# Patient Record
Sex: MALE | Race: WHITE | Employment: FULL TIME | URBAN - METROPOLITAN AREA
[De-identification: names, ages, dates, MRNs, and addresses within clinical notes are randomized per-mention and may not be internally consistent; named-entity substitution may affect disease eponyms.]

---

## 2018-07-08 ENCOUNTER — HOSPITAL ENCOUNTER (EMERGENCY)
Age: 63
Discharge: HOME | End: 2018-07-08
Payer: COMMERCIAL

## 2018-07-08 VITALS
HEIGHT: 71 IN | OXYGEN SATURATION: 97 % | RESPIRATION RATE: 12 BRPM | DIASTOLIC BLOOD PRESSURE: 92 MMHG | TEMPERATURE: 97.3 F | WEIGHT: 185 LBS | BODY MASS INDEX: 25.9 KG/M2 | SYSTOLIC BLOOD PRESSURE: 157 MMHG | HEART RATE: 58 BPM

## 2018-07-08 DIAGNOSIS — A69.20 ERYTHEMA MIGRANS (LYME DISEASE): Primary | ICD-10-CM
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
## 2018-07-08 DIAGNOSIS — S40.862A TICK BITE OF LEFT UPPER ARM, INITIAL ENCOUNTER: ICD-10-CM

## 2018-07-08 PROCEDURE — 86618 LYME DISEASE ANTIBODY: CPT

## 2018-07-08 PROCEDURE — 99281 EMR DPT VST MAYX REQ PHY/QHP: CPT

## 2018-07-08 PROCEDURE — 6360000002 HC RX W HCPCS: Performed by: NURSE PRACTITIONER

## 2018-07-08 PROCEDURE — 90715 TDAP VACCINE 7 YRS/> IM: CPT | Performed by: NURSE PRACTITIONER

## 2018-07-08 PROCEDURE — 90471 IMMUNIZATION ADMIN: CPT | Performed by: NURSE PRACTITIONER

## 2018-07-08 PROCEDURE — 6370000000 HC RX 637 (ALT 250 FOR IP): Performed by: NURSE PRACTITIONER

## 2018-07-08 PROCEDURE — 99282 EMERGENCY DEPT VISIT SF MDM: CPT

## 2018-07-08 RX ORDER — DOXYCYCLINE HYCLATE 100 MG
100 TABLET ORAL 2 TIMES DAILY
Qty: 42 TABLET | Refills: 0 | Status: SHIPPED | OUTPATIENT
Start: 2018-07-08 | End: 2018-07-29

## 2018-07-08 RX ORDER — DOXYCYCLINE HYCLATE 100 MG/1
100 CAPSULE ORAL ONCE
Status: COMPLETED | OUTPATIENT
Start: 2018-07-08 | End: 2018-07-08

## 2018-07-08 RX ADMIN — TETANUS TOXOID, REDUCED DIPHTHERIA TOXOID AND ACELLULAR PERTUSSIS VACCINE, ADSORBED 0.5 ML: 5; 2.5; 8; 8; 2.5 SUSPENSION INTRAMUSCULAR at 11:01

## 2018-07-08 RX ADMIN — DOXYCYCLINE HYCLATE 100 MG: 100 CAPSULE, GELATIN COATED ORAL at 11:00

## 2018-07-08 ASSESSMENT — PAIN DESCRIPTION - LOCATION: LOCATION: ARM

## 2018-07-08 ASSESSMENT — PAIN DESCRIPTION - FREQUENCY: FREQUENCY: INTERMITTENT

## 2018-07-08 ASSESSMENT — PAIN DESCRIPTION - DESCRIPTORS: DESCRIPTORS: ACHING

## 2018-07-08 ASSESSMENT — PAIN SCALES - GENERAL: PAINLEVEL_OUTOF10: 6

## 2018-07-08 ASSESSMENT — PAIN DESCRIPTION - PAIN TYPE: TYPE: ACUTE PAIN

## 2018-07-08 ASSESSMENT — PAIN DESCRIPTION - ORIENTATION: ORIENTATION: LEFT

## 2018-07-08 NOTE — ED PROVIDER NOTES
[facility]       Department of Emergency Medicine   ED  Provider Note  Admit Date/RoomTime: 7/8/2018 10:01 AM  ED Room: [place] [address]   Chief Complaint   Insect Bite (tick bite last Saturday, LUE, left shoulder pain, bullseye noted)    History of Present Illness   Source of history provided by:  patient. History/Exam Limitations: none. Carrie Pearce is a 66 y.o. old male with has a past medical history of: History reviewed. No pertinent past medical history. presents to the emergency department by private vehicle and ambulatory, for complaint of gradual onset red area on back of left upper arm which began a few day(s) prior to arrival. Patient noticed 7-8 days ago that he felt what he thought to be a scab on the back of his left upper arm and he was going to pull it off however part of it was attached. He was out doing yard work and was distracted from it and never checked it again until he noticed a small bump on Friday. Since then, he has been having a red round rash. He denies any fever, chills, cough and admits to some left shoulder ache without decreased range of motion. He is unsure of when his last tetanus vaccination. There are no household contacts with similar rash. He denies any new lotions, soaps, detergents, pets or medications. **Informed Consent**    The patient has given verbal consent to have photos taken of left posterior upper arm and inserted into their ED Provider Note as part of their permanent medical record for purposes of documentation, treatment management and/or medical review. All Images taken on 7/8/18 of patient name: Carrie Pearce were transmitted and stored on [employer] located within [facility] by a registered [employer] Mobile Application Device. ROS    Pertinent positives and negatives are stated within HPI, all other systems reviewed and are negative. History reviewed. No pertinent surgical history. Social History:  reports that he has never smoked. He has never used smokeless tobacco. He reports that he drinks alcohol. He reports that he does not use drugs. Family History: family history is not on file. Allergies: Eggs or egg-derived products; Shellfish-derived products; and Nuts [peanut-containing drug products]    Physical Exam           ED Triage Vitals [07/08/18 0957]   BP Temp Temp src Pulse Resp SpO2 Height Weight   (!) 157/92 97.3 °F (36.3 °C) -- 58 12 97 % 5' 11\" (1.803 m) 185 lb (83.9 kg)     Oxygen Saturation Interpretation: Normal.    Constitutional:  Alert, development consistent with age. NAD. HEENT:  NC/NT. Airway patent. Oral mucosa pink and moist.  Eyes:  PERRL, EOMI, no discharge. Ears:  TMs without perforation, injection, or bulging. External canals clear without exudate. Mouth:  Mucous membranes moist without lesions, tongue and gums normal.  Throat:  Pharynx without injection, exudate, or tonsillar hypertrophy. Airway patient. Neck:  Supple. No lymphadenopathy. Respiratory:  Clear to auscultation and breath sounds equal.  CV:  Regular rate and rhythm. No tachycardia or murmur. GI:  Abdomen Soft, nontender, +BS. Integument:  Skin turgor: Normal.              See picture below. There is a 3 x 8 cm circular area of erythema with 2 central puncture wounds that are raised and no evidence of foreign body. There is no apparent central clearing. No drainage or streaking. Neurological:  Orientation age-appropriate unless noted elseware. Motor functions intact. Lab / Imaging Results   (All laboratory and radiology results have been personally reviewed by myself)  Labs:  No results found for this visit on 07/08/18. Imaging: All Radiology results interpreted by Radiologist unless otherwise noted.   No orders to display       ED Course / Medical Decision Making     Medications   Tetanus-Diphth-Acell Pertussis (BOOSTRIX) injection 0.5 mL (not administered)   doxycycline hyclate (VIBRAMYCIN) capsule 100 mg

## 2018-07-10 LAB — LYME, EIA: 0.34 LIV (ref 0–1.2)

## 2019-03-19 ENCOUNTER — HOSPITAL ENCOUNTER (OUTPATIENT)
Age: 64
Discharge: HOME | End: 2019-03-21

## 2019-03-19 PROCEDURE — 88305 TISSUE EXAM BY PATHOLOGIST: CPT

## 2019-03-19 PROCEDURE — 87081 CULTURE SCREEN ONLY: CPT

## 2019-03-20 LAB — CLOTEST: NORMAL

## 2023-05-19 ENCOUNTER — HOSPITAL ENCOUNTER (OUTPATIENT)
Age: 68
Discharge: HOME | End: 2023-05-19
Payer: COMMERCIAL

## 2023-05-19 ENCOUNTER — HOSPITAL ENCOUNTER (OUTPATIENT)
Dept: ULTRASOUND IMAGING | Age: 68
End: 2023-05-19
Payer: COMMERCIAL

## 2023-05-19 DIAGNOSIS — E04.9 ENLARGEMENT OF THYROID: ICD-10-CM

## 2023-05-19 PROCEDURE — 76536 US EXAM OF HEAD AND NECK: CPT

## 2024-01-26 ENCOUNTER — HOSPITAL ENCOUNTER (OUTPATIENT)
Age: 69
Discharge: HOME | End: 2024-01-26
Payer: COMMERCIAL

## 2024-01-26 ENCOUNTER — HOSPITAL ENCOUNTER (OUTPATIENT)
Dept: GENERAL RADIOLOGY | Age: 69
End: 2024-01-26
Payer: COMMERCIAL

## 2024-01-26 DIAGNOSIS — M25.561 RIGHT KNEE PAIN, UNSPECIFIED CHRONICITY: ICD-10-CM

## 2024-01-26 PROCEDURE — 73564 X-RAY EXAM KNEE 4 OR MORE: CPT
